# Patient Record
(demographics unavailable — no encounter records)

---

## 2025-03-24 NOTE — PHYSICAL EXAM
[Well Developed] : well developed [Well Nourished] : well nourished [No Acute Distress] : no acute distress [Normal Conjunctiva] : normal conjunctiva [Normal Venous Pressure] : normal venous pressure [No Carotid Bruit] : no carotid bruit [No Murmur] : no murmur [No Gallop] : no gallop [Clear Lung Fields] : clear lung fields [Good Air Entry] : good air entry [No Respiratory Distress] : no respiratory distress  [Soft] : abdomen soft [Non Tender] : non-tender [No Masses/organomegaly] : no masses/organomegaly [Normal Bowel Sounds] : normal bowel sounds [Normal Gait] : normal gait [No Edema] : no edema [No Cyanosis] : no cyanosis [No Clubbing] : no clubbing [No Varicosities] : no varicosities [No Rash] : no rash [Moves all extremities] : moves all extremities [No Focal Deficits] : no focal deficits [Normal Speech] : normal speech [Alert and Oriented] : alert and oriented [Normal memory] : normal memory [de-identified] : irregular, tachycardic

## 2025-03-24 NOTE — HISTORY OF PRESENT ILLNESS
[FreeTextEntry1] :  50 year old M with PMHx of atrial flutter s/p cardioversion in 2/19/2025, hyperthyroidism presents to establish cardiac care.  Patient was taking weight loss supplements and lost 75 lbs Went for annual visit with PCP in 1/2025, EKG revealed atrial tachycardia, referred to cardiologist, labs revealed hyperactive thyroid Instructed to stop weight loss supplements, thyroid hormones improved, started on Eliquis 5 BID, underwent cardioversion at [facility] in 2/19/2025 Thyroid hormones returned to abnormal levels and recently started on Methimazole few days ago Denies associated palpitations, lightheadedness, dizziness, cp, sob, syncope  Walks 30 minutes twice daily for exercise, no exertional cp Reports dyspnea on exertion with stairs and fast walking  Reduced caffeine intake significantly, now drinking 1 cup of decaf per day  Hyperthyroidism managed by PCP Dr. Aravind Chandler  Pt. denies any chest pain,  orthopnea, PND, palpitations, lightheadedness, syncope or lower extremity edema. He realized that he was back in AF a week post [facility] reports 2 flight [place] ================================ Previous workup: Labs (3/19/2025): TSH < 0.01 T4 3.2 T3 11.4  Labs (1/2025):  HDL 22 LDL 99  K 3.9 Cr  0.44 A1c 5.8%

## 2025-03-24 NOTE — DISCUSSION/SUMMARY
[EKG obtained to assist in diagnosis and management of assessed problem(s)] : EKG obtained to assist in diagnosis and management of assessed problem(s) [FreeTextEntry1] : EKG:AF feel af probably due to hyperthyroid- no"point" in DCC until euthyroid add BB and continue Eliquis for AF exercise for Lw HDL will get nuclear stress to r/o CAD get records and ALEXANDER report

## 2025-05-08 NOTE — PHYSICAL EXAM
[Well Developed] : well developed [Well Nourished] : well nourished [No Acute Distress] : no acute distress [Normal Conjunctiva] : normal conjunctiva [Normal Venous Pressure] : normal venous pressure [No Carotid Bruit] : no carotid bruit [Normal S1, S2] : normal S1, S2 [No Murmur] : no murmur [No Gallop] : no gallop [Clear Lung Fields] : clear lung fields [Good Air Entry] : good air entry [No Respiratory Distress] : no respiratory distress  [Soft] : abdomen soft [Non Tender] : non-tender [No Masses/organomegaly] : no masses/organomegaly [Normal Bowel Sounds] : normal bowel sounds [Normal Gait] : normal gait [No Edema] : no edema [No Cyanosis] : no cyanosis [No Clubbing] : no clubbing [No Varicosities] : no varicosities [No Rash] : no rash [Moves all extremities] : moves all extremities [No Focal Deficits] : no focal deficits [Normal Speech] : normal speech [Alert and Oriented] : alert and oriented [Normal memory] : normal memory

## 2025-05-08 NOTE — DISCUSSION/SUMMARY
[FreeTextEntry1] : EKG:SB TTE:normal LVEF,Tr/MR,mild pulm HPTN ?HIGINOI would recommend pulmonary evaluation- will get monitor to see if still has PAF for now reduce BB and continue Eliquis for paf,diet and f/u labs for HLD; exercise for low HDL

## 2025-06-13 NOTE — REASON FOR VISIT
[Initial] : an initial visit [Pulmonary Hypertension] : pulmonary hypertension [TextBox_13] : Dr. Anupam Amado

## 2025-06-13 NOTE — ASSESSMENT
[FreeTextEntry1] : 51 yo M hx of morbid obesity s/p gastric sleeve, complicated by return of weight and now with significant weight loss after dietary modification was found to have new onset AF likely associated with undiagnosed hyperthyroidism, found to have mildly elevated PASP on echocardiogram for work up of AF  #PH - Likely group V, II #AF #Hyperthyroidism  Patient is NYHA FC I, was symptomatic from his atrial fibrillation but now much improved and no exercise intolerance. Hyperthyroidism and AF (Group V and II respectively) are both known to be associated with PH. Reassuringly, the PASP elevation is mild and the RV size/function is normal which does not suggest underlying significant pathology. He has no risk factors for group I, III, or IV PH, no exam findings to suggest underlying PAH or connective tissue disease. Given the TTE was performed when pt still had uncontrolled hyperthyroidism would opt for repeat TTE in 6-12 months.   Will obtain PFTs to r/o underlying ILD, if DLCO reduced or lung volumes restricted may undergo HRCT. Obtain PAH serology today.   If above work up is normal but repeat TTE continues to show PH will opt for further testing, ie sleep study, V/Q scan, exercise testing (ie CPET).  Explained to patient in detail who is agreeable. RTC in 6 months, sooner if symptoms develop.  **Addendum** PFTs all normal including DLCO. No indication for chest imaging at this time, follow in 6 months

## 2025-06-13 NOTE — HISTORY OF PRESENT ILLNESS
[Never] : never [TextBox_4] : 51 yo M hx of AF s/p cardioversion (2/19/2025) but noted to be back in AF after follow up, hyperthyroidism newly diagnosed and likely driving AF started on bbl by cardiology and last ECG noted to have sinus bradycardia, TTE with mild PH and referred for PH evaluation. At time of TTE, TSH still undetectable.  Had SOB when he was in Afib but hat has gone away. He has lost 80 pounds on a very strict diet. He used to be 310 pounds. He walks 30 minutes twice daily. He does not have to stop on inclines. No swelling of his legs. No history of lung disease. No chemical or inhalant exposure. No mold or asbestos. No wheezing or cough.   PH Risk factors: FH sudden cardiac death: (-) IVDU or substance use including alcohol :(-) Anorexigens :(-) Amphetamines :(-) Rheumatological dx :(-) Congenital heart dx :(-) HTN,Afib, diastolic dysfunction, large LA, valvular dx :(-) Metabolic syndrome including obesity :(-) Parenchymal lung dx :(-) HIGINIO :(-) Hx of DVT or PE: (-) Hx of splenectomy :(-) Liver dx :(-) Kidney dx :(-) Hematological/Malignancy/Anemia :(-) HIV:(-)

## 2025-06-13 NOTE — HISTORY OF PRESENT ILLNESS
[Never] : never [TextBox_4] : 49 yo M hx of AF s/p cardioversion (2/19/2025) but noted to be back in AF after follow up, hyperthyroidism newly diagnosed and likely driving AF started on bbl by cardiology and last ECG noted to have sinus bradycardia, TTE with mild PH and referred for PH evaluation. At time of TTE, TSH still undetectable.  Had SOB when he was in Afib but hat has gone away. He has lost 80 pounds on a very strict diet. He used to be 310 pounds. He walks 30 minutes twice daily. He does not have to stop on inclines. No swelling of his legs. No history of lung disease. No chemical or inhalant exposure. No mold or asbestos. No wheezing or cough.   PH Risk factors: FH sudden cardiac death: (-) IVDU or substance use including alcohol :(-) Anorexigens :(-) Amphetamines :(-) Rheumatological dx :(-) Congenital heart dx :(-) HTN,Afib, diastolic dysfunction, large LA, valvular dx :(-) Metabolic syndrome including obesity :(-) Parenchymal lung dx :(-) HIGINIO :(-) Hx of DVT or PE: (-) Hx of splenectomy :(-) Liver dx :(-) Kidney dx :(-) Hematological/Malignancy/Anemia :(-) HIV:(-)

## 2025-06-13 NOTE — PROCEDURE
[FreeTextEntry1] : Nuclear stress test 5/2/25  1. Normal myocardial perfusion scan, with no evidence of infarction or inducible ischemia. 2. The ECG is negative for ischemia.  TTE 5/8/25 Normal LV size/function, normal diastolic function, normal RV size/function, mild TR, PASP 40mmHg  TSH 5/6/25 - < 0.01 (LOW)  Labs 3/19/25 FT4 3.2 (HIGH) FT3 11.4 (HIGH) TSH <0.01 (LOW)

## 2025-07-15 NOTE — DISCUSSION/SUMMARY
[FreeTextEntry1] : EKG:NSR continue toprol for NSVT;Eliquis for paf; check lipids for low HDL( having alot of nuts) once euthyroid consider ILR to see if has PAF